# Patient Record
Sex: MALE | URBAN - NONMETROPOLITAN AREA
[De-identification: names, ages, dates, MRNs, and addresses within clinical notes are randomized per-mention and may not be internally consistent; named-entity substitution may affect disease eponyms.]

---

## 2023-11-30 ENCOUNTER — OFFICE VISIT (OUTPATIENT)
Dept: URBAN - NONMETROPOLITAN AREA CLINIC 4 | Facility: CLINIC | Age: 73
End: 2023-11-30

## 2023-11-30 RX ORDER — THIAMINE MONONITRATE (VIT B1) 100 MG
1 TABLET TABLET ORAL ONCE A DAY
Status: ACTIVE | COMMUNITY

## 2023-11-30 RX ORDER — CARVEDILOL 3.12 MG/1
1 TABLET WITH FOOD TABLET, FILM COATED ORAL TWICE A DAY
Status: ACTIVE | COMMUNITY

## 2023-11-30 RX ORDER — SUCRALFATE 1 G/1
1 TABLET ON AN EMPTY STOMACH TABLET ORAL TWICE A DAY
Status: ACTIVE | COMMUNITY

## 2023-11-30 RX ORDER — PANTOPRAZOLE SODIUM 40 MG/1
1 TABLET TABLET, DELAYED RELEASE ORAL ONCE A DAY
Status: ACTIVE | COMMUNITY

## 2023-11-30 RX ORDER — CHOLECALCIFEROL (VITAMIN D3) 50 MCG
1 CAPSULE CAPSULE ORAL ONCE A DAY
Status: ACTIVE | COMMUNITY

## 2023-11-30 RX ORDER — FOLIC ACID 1 MG/1
1 TABLET TABLET ORAL ONCE A DAY
Status: ACTIVE | COMMUNITY

## 2023-11-30 RX ORDER — AMLODIPINE BESYLATE 10 MG/1
1 TABLET TABLET ORAL ONCE A DAY
Status: ACTIVE | COMMUNITY

## 2023-11-30 RX ORDER — PRAVASTATIN SODIUM 10 MG/1
2 TABLETS TABLET ORAL ONCE A DAY
Status: ACTIVE | COMMUNITY

## 2024-01-08 ENCOUNTER — DASHBOARD ENCOUNTERS (OUTPATIENT)
Age: 74
End: 2024-01-08

## 2024-01-08 ENCOUNTER — OFFICE VISIT (OUTPATIENT)
Dept: URBAN - NONMETROPOLITAN AREA CLINIC 4 | Facility: CLINIC | Age: 74
End: 2024-01-08
Payer: MEDICARE

## 2024-01-08 VITALS
DIASTOLIC BLOOD PRESSURE: 68 MMHG | TEMPERATURE: 97.2 F | HEIGHT: 70 IN | WEIGHT: 185.6 LBS | BODY MASS INDEX: 26.57 KG/M2 | HEART RATE: 61 BPM | SYSTOLIC BLOOD PRESSURE: 126 MMHG

## 2024-01-08 DIAGNOSIS — K76.89 HEPATIC CYST: ICD-10-CM

## 2024-01-08 DIAGNOSIS — K70.30 CIRRHOSIS WITH ALCOHOLISM: ICD-10-CM

## 2024-01-08 DIAGNOSIS — K27.9 PUD (PEPTIC ULCER DISEASE): ICD-10-CM

## 2024-01-08 PROBLEM — 85057007: Status: ACTIVE | Noted: 2024-01-08

## 2024-01-08 PROBLEM — 19943007: Status: ACTIVE | Noted: 2024-01-08

## 2024-01-08 PROBLEM — 13200003: Status: ACTIVE | Noted: 2024-01-08

## 2024-01-08 PROCEDURE — 99205 OFFICE O/P NEW HI 60 MIN: CPT | Performed by: INTERNAL MEDICINE

## 2024-01-08 RX ORDER — CARVEDILOL 3.12 MG/1
1 TABLET WITH FOOD TABLET, FILM COATED ORAL TWICE A DAY
Status: ACTIVE | COMMUNITY

## 2024-01-08 RX ORDER — AMLODIPINE BESYLATE 10 MG/1
1 TABLET TABLET ORAL ONCE A DAY
Status: ACTIVE | COMMUNITY

## 2024-01-08 RX ORDER — PRAVASTATIN SODIUM 10 MG/1
2 TABLETS TABLET ORAL ONCE A DAY
Status: ACTIVE | COMMUNITY

## 2024-01-08 RX ORDER — PANTOPRAZOLE SODIUM 40 MG/1
1 TABLET TABLET, DELAYED RELEASE ORAL ONCE A DAY
Status: ACTIVE | COMMUNITY

## 2024-01-08 RX ORDER — SUCRALFATE 1 G/1
1 TABLET ON AN EMPTY STOMACH TABLET ORAL TWICE A DAY
Status: ACTIVE | COMMUNITY

## 2024-01-08 RX ORDER — THIAMINE MONONITRATE (VIT B1) 100 MG
1 TABLET TABLET ORAL ONCE A DAY
Status: ACTIVE | COMMUNITY

## 2024-01-08 RX ORDER — CHOLECALCIFEROL (VITAMIN D3) 50 MCG
1 CAPSULE CAPSULE ORAL ONCE A DAY
Status: ACTIVE | COMMUNITY

## 2024-01-08 RX ORDER — FOLIC ACID 1 MG/1
1 TABLET TABLET ORAL ONCE A DAY
Status: ACTIVE | COMMUNITY

## 2024-01-08 NOTE — HPI-TODAY'S VISIT:
Cirrhosis: diagnosed years ago.  Pt reportsd that this was due to etoh No hep C.  Pt reports that he still drinks etoh- a few times per week.  Pt reports that he drinks liquor.  Pt rp that he went to hospital with etoh withdrawal.  Pt reports that he was found to have urosepsis. No ascites, no confusion. No esophageal varices on endo .  Dysphagia: EGD with dilation of upper esophagus- and the barium swallowing. Resolved with dilation.  PUD: on ppi and carafate. 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  Completed all abx assoc with hospitalization.  Imaging reviewed in detail along with EGD- transitioning care from [facility]  Not following strict 2 gm sodium No PSE or ascites

## 2024-01-18 ENCOUNTER — WEB ENCOUNTER (OUTPATIENT)
Dept: URBAN - NONMETROPOLITAN AREA CLINIC 4 | Facility: CLINIC | Age: 74
End: 2024-01-18

## 2024-01-25 ENCOUNTER — OFFICE VISIT (OUTPATIENT)
Dept: URBAN - NONMETROPOLITAN AREA CLINIC 4 | Facility: CLINIC | Age: 74
End: 2024-01-25

## 2024-05-15 ENCOUNTER — LAB OUTSIDE AN ENCOUNTER (OUTPATIENT)
Dept: URBAN - NONMETROPOLITAN AREA CLINIC 4 | Facility: CLINIC | Age: 74
End: 2024-05-15

## 2024-06-11 ENCOUNTER — LAB OUTSIDE AN ENCOUNTER (OUTPATIENT)
Dept: URBAN - NONMETROPOLITAN AREA CLINIC 4 | Facility: CLINIC | Age: 74
End: 2024-06-11

## 2024-07-10 ENCOUNTER — OFFICE VISIT (OUTPATIENT)
Dept: URBAN - NONMETROPOLITAN AREA CLINIC 4 | Facility: CLINIC | Age: 74
End: 2024-07-10
Payer: MEDICARE

## 2024-07-10 VITALS
HEART RATE: 91 BPM | HEIGHT: 70 IN | DIASTOLIC BLOOD PRESSURE: 67 MMHG | TEMPERATURE: 98.2 F | SYSTOLIC BLOOD PRESSURE: 104 MMHG | BODY MASS INDEX: 26.28 KG/M2 | WEIGHT: 183.6 LBS

## 2024-07-10 DIAGNOSIS — K70.30 CIRRHOSIS WITH ALCOHOLISM: ICD-10-CM

## 2024-07-10 DIAGNOSIS — K27.9 PUD (PEPTIC ULCER DISEASE): ICD-10-CM

## 2024-07-10 DIAGNOSIS — K76.9 LIVER LESION: ICD-10-CM

## 2024-07-10 DIAGNOSIS — K22.10 EROSIVE ESOPHAGITIS: ICD-10-CM

## 2024-07-10 PROBLEM — 300331000: Status: ACTIVE | Noted: 2024-07-10

## 2024-07-10 PROBLEM — 40719004: Status: ACTIVE | Noted: 2024-07-10

## 2024-07-10 PROCEDURE — 99214 OFFICE O/P EST MOD 30 MIN: CPT | Performed by: INTERNAL MEDICINE

## 2024-07-10 RX ORDER — FOLIC ACID 1 MG/1
1 TABLET TABLET ORAL ONCE A DAY
Status: ACTIVE | COMMUNITY

## 2024-07-10 RX ORDER — PRAVASTATIN SODIUM 10 MG/1
2 TABLETS TABLET ORAL ONCE A DAY
Status: ON HOLD | COMMUNITY

## 2024-07-10 RX ORDER — SUCRALFATE 1 G/1
1 TABLET ON AN EMPTY STOMACH TABLET ORAL TWICE A DAY
Status: ON HOLD | COMMUNITY

## 2024-07-10 RX ORDER — PANTOPRAZOLE SODIUM 40 MG/1
1 TABLET TABLET, DELAYED RELEASE ORAL ONCE A DAY
Status: ACTIVE | COMMUNITY

## 2024-07-10 RX ORDER — ROSUVASTATIN CALCIUM 20 MG/1
1 TABLET TABLET, COATED ORAL ONCE A DAY
Status: ACTIVE | COMMUNITY

## 2024-07-10 RX ORDER — CARVEDILOL 3.12 MG/1
1 TABLET WITH FOOD TABLET, FILM COATED ORAL TWICE A DAY
Status: ACTIVE | COMMUNITY

## 2024-07-10 RX ORDER — THIAMINE MONONITRATE (VIT B1) 100 MG
1 TABLET TABLET ORAL ONCE A DAY
Status: ACTIVE | COMMUNITY

## 2024-07-10 RX ORDER — CHOLECALCIFEROL (VITAMIN D3) 50 MCG
1 CAPSULE CAPSULE ORAL ONCE A DAY
Status: ACTIVE | COMMUNITY

## 2024-07-10 RX ORDER — AMLODIPINE BESYLATE 10 MG/1
1 TABLET TABLET ORAL ONCE A DAY
Status: ACTIVE | COMMUNITY

## 2024-07-10 NOTE — HPI-TODAY'S VISIT:
Cirrhosis: diagnosed years ago.  Pt reportsd that this was due to etoh No hep C.  Pt reports that he still drinks etoh- a few times per week.  Pt reports that he drinks liquor.  Pt rp that he went to hospital with etoh withdrawal.  Pt reports that he was found to have urosepsis. No ascites, no confusion. No esophageal varices on endo .  Dysphagia: EGD with dilation of upper esophagus- and the barium swallowing. Resolved with dilation.  PUD: on ppi and carafate. 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  Completed all abx assoc with hospitalization.  Imaging reviewed in detail along with EGD- transitioning care from [facility]  Not following strict 2 gm sodium No PSE or ascites  Follow Up 7/10/24: Patient presents for transition of care. He carries a diagnosis of Alcoholic cirrhosis x 8 years. Viral markers were normal in 2019. No signs of portal hypertension. He is currently drinking couple of drinks of vodka daily. He has been advised against drinking. No blood work on file. CT imaging in 2023 revealed a 4 cm area in right lobe of concern. He has not had a follow up CT 3 phase. He c/o intermitent episodes of nausea and loss of appetite.

## 2024-07-11 ENCOUNTER — TELEPHONE ENCOUNTER (OUTPATIENT)
Dept: URBAN - METROPOLITAN AREA CLINIC 54 | Facility: CLINIC | Age: 74
End: 2024-07-11

## 2024-07-11 LAB
ABSOLUTE BASOPHILS: 32
ABSOLUTE EOSINOPHILS: 32
ABSOLUTE LYMPHOCYTES: 912
ABSOLUTE MONOCYTES: 588
ABSOLUTE NEUTROPHILS: 3737
ALBUMIN/GLOBULIN RATIO: 1.4
ALBUMIN: 4.8
ALKALINE PHOSPHATASE: 55
ALT: 26
AST: 40
BASOPHILS: 0.6
BILIRUBIN, TOTAL: 1.9
BUN/CREATININE RATIO: 8
CALCIUM: 9.7
CARBON DIOXIDE: 23
CHLORIDE: 102
CREATININE: 3.31
EGFR: 19
EOSINOPHILS: 0.6
FIB 4 INDEX: 2.42
FIB 4 INTERPRETATION: (no result)
GLOBULIN: 3.4
GLUCOSE: 117
HEMATOCRIT: 36.7
HEMOGLOBIN: 12.6
INR: 1.1
LYMPHOCYTES: 17.2
MCH: 33.3
MCHC: 34.3
MCV: 97.1
MONOCYTES: 11.1
MPV: 9.7
NEUTROPHILS: 70.5
PLATELET COUNT: 237
PLATELET COUNT: 237
POTASSIUM: 3.7
PROTEIN, TOTAL: 8.2
PT: 11.9
RDW: 15.1
RED BLOOD CELL COUNT: 3.78
SODIUM: 140
UREA NITROGEN (BUN): 27
WHITE BLOOD CELL COUNT: 5.3

## 2024-07-12 ENCOUNTER — TELEPHONE ENCOUNTER (OUTPATIENT)
Dept: URBAN - METROPOLITAN AREA CLINIC 54 | Facility: CLINIC | Age: 74
End: 2024-07-12

## 2024-07-12 RX ORDER — PANTOPRAZOLE SODIUM 40 MG/1
1 TABLET TABLET, DELAYED RELEASE ORAL TWICE DAILY
Qty: 60 | Refills: 3 | OUTPATIENT
Start: 2024-07-12

## 2024-07-16 ENCOUNTER — TELEPHONE ENCOUNTER (OUTPATIENT)
Dept: URBAN - METROPOLITAN AREA CLINIC 54 | Facility: CLINIC | Age: 74
End: 2024-07-16

## 2024-10-04 ENCOUNTER — LAB OUTSIDE AN ENCOUNTER (OUTPATIENT)
Dept: URBAN - METROPOLITAN AREA CLINIC 54 | Facility: CLINIC | Age: 74
End: 2024-10-04

## 2024-10-04 ENCOUNTER — TELEPHONE ENCOUNTER (OUTPATIENT)
Dept: URBAN - METROPOLITAN AREA CLINIC 54 | Facility: CLINIC | Age: 74
End: 2024-10-04

## 2024-10-04 PROBLEM — 19943007: Status: ACTIVE | Noted: 2024-10-04

## 2024-10-30 ENCOUNTER — TELEPHONE ENCOUNTER (OUTPATIENT)
Dept: URBAN - METROPOLITAN AREA CLINIC 54 | Facility: CLINIC | Age: 74
End: 2024-10-30

## 2024-11-01 ENCOUNTER — OFFICE VISIT (OUTPATIENT)
Dept: URBAN - NONMETROPOLITAN AREA CLINIC 4 | Facility: CLINIC | Age: 74
End: 2024-11-01

## 2024-11-05 ENCOUNTER — OFFICE VISIT (OUTPATIENT)
Dept: URBAN - NONMETROPOLITAN AREA CLINIC 4 | Facility: CLINIC | Age: 74
End: 2024-11-05
Payer: MEDICARE

## 2024-11-05 VITALS
HEIGHT: 70 IN | TEMPERATURE: 97.9 F | SYSTOLIC BLOOD PRESSURE: 131 MMHG | DIASTOLIC BLOOD PRESSURE: 85 MMHG | WEIGHT: 187.4 LBS | HEART RATE: 110 BPM | BODY MASS INDEX: 26.83 KG/M2

## 2024-11-05 DIAGNOSIS — K74.60 CIRRHOSIS OF LIVER WITHOUT ASCITES, UNSPECIFIED HEPATIC CIRRHOSIS TYPE: ICD-10-CM

## 2024-11-05 DIAGNOSIS — K70.30 CIRRHOSIS WITH ALCOHOLISM: ICD-10-CM

## 2024-11-05 DIAGNOSIS — K22.10 EROSIVE ESOPHAGITIS: ICD-10-CM

## 2024-11-05 DIAGNOSIS — K76.9 LIVER LESION: ICD-10-CM

## 2024-11-05 DIAGNOSIS — K27.9 PUD (PEPTIC ULCER DISEASE): ICD-10-CM

## 2024-11-05 DIAGNOSIS — K76.89 HEPATIC CYST: ICD-10-CM

## 2024-11-05 PROCEDURE — 99215 OFFICE O/P EST HI 40 MIN: CPT | Performed by: PHYSICIAN ASSISTANT

## 2024-11-05 RX ORDER — PANTOPRAZOLE SODIUM 40 MG/1
1 TABLET TABLET, DELAYED RELEASE ORAL TWICE DAILY
Qty: 60 | Refills: 3 | OUTPATIENT

## 2024-11-05 RX ORDER — ROSUVASTATIN CALCIUM 20 MG/1
1 TABLET TABLET, COATED ORAL ONCE A DAY
Status: ACTIVE | COMMUNITY

## 2024-11-05 RX ORDER — FOLIC ACID 1 MG/1
1 TABLET TABLET ORAL ONCE A DAY
Status: ACTIVE | COMMUNITY

## 2024-11-05 RX ORDER — THIAMINE MONONITRATE (VIT B1) 100 MG
1 TABLET TABLET ORAL ONCE A DAY
Status: ACTIVE | COMMUNITY

## 2024-11-05 RX ORDER — CHOLECALCIFEROL (VITAMIN D3) 50 MCG
1 CAPSULE CAPSULE ORAL ONCE A DAY
Status: ACTIVE | COMMUNITY

## 2024-11-05 RX ORDER — AMLODIPINE BESYLATE 10 MG/1
1 TABLET TABLET ORAL ONCE A DAY
Status: ACTIVE | COMMUNITY

## 2024-11-05 RX ORDER — PANTOPRAZOLE SODIUM 40 MG/1
1 TABLET TABLET, DELAYED RELEASE ORAL TWICE DAILY
Qty: 60 | Refills: 3 | Status: ACTIVE | COMMUNITY
Start: 2024-07-12

## 2024-11-05 RX ORDER — SUCRALFATE 1 G/1
1 TABLET ON AN EMPTY STOMACH TABLET ORAL TWICE A DAY
Status: ON HOLD | COMMUNITY

## 2024-11-05 RX ORDER — PANTOPRAZOLE SODIUM 40 MG/1
1 TABLET TABLET, DELAYED RELEASE ORAL ONCE A DAY
Status: ON HOLD | COMMUNITY

## 2024-11-05 RX ORDER — FERROUS SULFATE 325(65) MG
1 TABLET TABLET ORAL
Status: ACTIVE | COMMUNITY

## 2024-11-05 RX ORDER — PRAVASTATIN SODIUM 10 MG/1
2 TABLETS TABLET ORAL ONCE A DAY
Status: ON HOLD | COMMUNITY

## 2024-11-05 RX ORDER — CARVEDILOL 3.12 MG/1
1 TABLET WITH FOOD TABLET, FILM COATED ORAL TWICE A DAY
Status: ACTIVE | COMMUNITY

## 2024-11-05 NOTE — HPI-TODAY'S VISIT:
Cirrhosis: diagnosed years ago.  Pt reportsd that this was due to etoh No hep C.  Pt reports that he still drinks etoh- a few times per week.  Pt reports that he drinks liquor.  Pt rp that he went to hospital with etoh withdrawal.  Pt reports that he was found to have urosepsis. No ascites, no confusion. No esophageal varices on endo .  Dysphagia: EGD with dilation of upper esophagus- and the barium swallowing. Resolved with dilation.  PUD: on ppi and carafate. 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  Completed all abx assoc with hospitalization.  Imaging reviewed in detail along with EGD- transitioning care from [facility]  Not following strict 2 gm sodium No PSE or ascites  Follow Up 7/10/24: Patient presents for transition of care. He carries a diagnosis of Alcoholic cirrhosis x 8 years. Viral markers were normal in 2019. No signs of portal hypertension. He is currently drinking couple of drinks of vodka daily. He has been advised against drinking. No blood work on file. CT imaging in 2023 revealed a 4 cm area in right lobe of concern. He has not had a follow up CT 3 phase. He c/o intermitent episodes of nausea and loss of appetite.  11.5.24 doing well, here today with wife.  Still drinkng ETOH, maybe 2 times per week, drinks 8oz each time. today with some mild LE edema, otherwise wife denies any symptoms  MRI abd, with cirrhosis, otherwise 4 cm right lobe cyst with no evidence of HCC nor PVT

## 2024-11-15 LAB
AFP, SERUM: 4.6
AFP-L3%, SERUM: (no result)
ALBUMIN/GLOBULIN RATIO: 1.4
ALBUMIN: 4.4
ALKALINE PHOSPHATASE: 55
ALT: 8
AST: 17
BILIRUBIN, TOTAL: 0.9
BUN/CREATININE RATIO: 11
CALCIUM: 9.3
CARBON DIOXIDE: 22
CHLORIDE: 106
CREATININE: 1.94
EGFR: 36
FIB 4 INDEX: 1.87
FIB 4 INTERPRETATION: (no result)
FIB 4 SUMMARY: (no result)
GLOBULIN: 3.2
GLUCOSE: 99
INR: 1.1
PLATELET COUNT: 235
POTASSIUM: 3.8
PROTEIN, TOTAL: 7.6
PT: 11.5
SODIUM: 140
UREA NITROGEN (BUN): 22

## 2025-02-03 ENCOUNTER — OFFICE VISIT (OUTPATIENT)
Dept: URBAN - NONMETROPOLITAN AREA CLINIC 4 | Facility: CLINIC | Age: 75
End: 2025-02-03
Payer: MEDICARE

## 2025-02-03 VITALS
SYSTOLIC BLOOD PRESSURE: 124 MMHG | TEMPERATURE: 98.2 F | HEART RATE: 99 BPM | BODY MASS INDEX: 26.63 KG/M2 | HEIGHT: 70 IN | DIASTOLIC BLOOD PRESSURE: 55 MMHG | WEIGHT: 186 LBS

## 2025-02-03 DIAGNOSIS — K27.9 PUD (PEPTIC ULCER DISEASE): ICD-10-CM

## 2025-02-03 DIAGNOSIS — K76.89 HEPATIC CYST: ICD-10-CM

## 2025-02-03 DIAGNOSIS — K22.10 EROSIVE ESOPHAGITIS: ICD-10-CM

## 2025-02-03 DIAGNOSIS — K76.9 LIVER LESION: ICD-10-CM

## 2025-02-03 DIAGNOSIS — K74.60 CIRRHOSIS OF LIVER WITHOUT ASCITES, UNSPECIFIED HEPATIC CIRRHOSIS TYPE: ICD-10-CM

## 2025-02-03 DIAGNOSIS — K70.30 CIRRHOSIS WITH ALCOHOLISM: ICD-10-CM

## 2025-02-03 PROCEDURE — 99215 OFFICE O/P EST HI 40 MIN: CPT | Performed by: PHYSICIAN ASSISTANT

## 2025-02-03 RX ORDER — THIAMINE HCL 100 MG
1 TABLET TABLET ORAL ONCE A DAY
Status: ACTIVE | COMMUNITY

## 2025-02-03 RX ORDER — CARVEDILOL 3.12 MG/1
1 TABLET WITH FOOD TABLET, FILM COATED ORAL TWICE A DAY
Status: ACTIVE | COMMUNITY

## 2025-02-03 RX ORDER — FERROUS SULFATE 325(65) MG
1 TABLET TABLET ORAL
Status: ACTIVE | COMMUNITY

## 2025-02-03 RX ORDER — CHOLECALCIFEROL (VITAMIN D3) 50 MCG
1 CAPSULE CAPSULE ORAL ONCE A DAY
Status: ACTIVE | COMMUNITY

## 2025-02-03 RX ORDER — PANTOPRAZOLE SODIUM 40 MG/1
1 TABLET TABLET, DELAYED RELEASE ORAL TWICE DAILY
Qty: 60 | Refills: 3 | Status: ACTIVE | COMMUNITY

## 2025-02-03 RX ORDER — THIAMINE MONONITRATE (VIT B1) 100 MG
1 TABLET TABLET ORAL ONCE A DAY
Status: ACTIVE | COMMUNITY

## 2025-02-03 RX ORDER — AMLODIPINE BESYLATE 10 MG/1
1 TABLET TABLET ORAL ONCE A DAY
Status: ACTIVE | COMMUNITY

## 2025-02-03 RX ORDER — PRAVASTATIN SODIUM 10 MG/1
2 TABLETS TABLET ORAL ONCE A DAY
Status: ON HOLD | COMMUNITY

## 2025-02-03 RX ORDER — SUCRALFATE 1 G/1
1 TABLET ON AN EMPTY STOMACH TABLET ORAL TWICE A DAY
Status: ON HOLD | COMMUNITY

## 2025-02-03 RX ORDER — FOLIC ACID 1 MG/1
1 TABLET TABLET ORAL ONCE A DAY
Status: ACTIVE | COMMUNITY

## 2025-02-03 RX ORDER — ROSUVASTATIN CALCIUM 20 MG/1
1 TABLET TABLET, COATED ORAL ONCE A DAY
Status: ACTIVE | COMMUNITY

## 2025-02-03 RX ORDER — PANTOPRAZOLE SODIUM 40 MG/1
1 TABLET TABLET, DELAYED RELEASE ORAL TWICE DAILY
Qty: 60 | Refills: 3 | OUTPATIENT

## 2025-02-03 RX ORDER — PANTOPRAZOLE SODIUM 40 MG/1
1 TABLET TABLET, DELAYED RELEASE ORAL ONCE A DAY
Status: ON HOLD | COMMUNITY

## 2025-02-03 NOTE — HPI-TODAY'S VISIT:
Cirrhosis: diagnosed years ago.  Pt reportsd that this was due to etoh No hep C.  Pt reports that he still drinks etoh- a few times per week.  Pt reports that he drinks liquor.  Pt rp that he went to hospital with etoh withdrawal.  Pt reports that he was found to have urosepsis. No ascites, no confusion. No esophageal varices on endo .  Dysphagia: EGD with dilation of upper esophagus- and the barium swallowing. Resolved with dilation.  PUD: on ppi and carafate. 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  Completed all abx assoc with hospitalization.  Imaging reviewed in detail along with EGD- transitioning care from [facility]  Not following strict 2 gm sodium No PSE or ascites  Follow Up 7/10/24: Patient presents for transition of care. He carries a diagnosis of Alcoholic cirrhosis x 8 years. Viral markers were normal in 2019. No signs of portal hypertension. He is currently drinking couple of drinks of vodka daily. He has been advised against drinking. No blood work on file. CT imaging in 2023 revealed a 4 cm area in right lobe of concern. He has not had a follow up CT 3 phase. He c/o intermitent episodes of nausea and loss of appetite.  11.5.24 doing well, here today with wife.  Still drinkng ETOH, maybe 2 times per week, drinks 8oz each time. today with some mild LE edema, otherwise wife denies any symptoms  MRI abd, with cirrhosis, otherwise 4 cm right lobe cyst with no evidence of HCC nor PVT  2.3.25 f/u from last OV where LE 1+ edema noted. Now using salt substitute and resolution of LE edema.  Still drinking, yesterday had 2 drinks Aware of recommendations for ZERO EtOH with known ETOH cirrhosis.  No other signs of PSE, edema, ascites etc.

## 2025-04-01 ENCOUNTER — LAB OUTSIDE AN ENCOUNTER (OUTPATIENT)
Dept: URBAN - NONMETROPOLITAN AREA CLINIC 4 | Facility: CLINIC | Age: 75
End: 2025-04-01

## 2025-05-05 ENCOUNTER — TELEPHONE ENCOUNTER (OUTPATIENT)
Dept: URBAN - NONMETROPOLITAN AREA CLINIC 4 | Facility: CLINIC | Age: 75
End: 2025-05-05